# Patient Record
Sex: MALE | Race: AMERICAN INDIAN OR ALASKA NATIVE
[De-identification: names, ages, dates, MRNs, and addresses within clinical notes are randomized per-mention and may not be internally consistent; named-entity substitution may affect disease eponyms.]

---

## 2019-08-05 ENCOUNTER — HOSPITAL ENCOUNTER (EMERGENCY)
Dept: HOSPITAL 74 - JER | Age: 29
Discharge: HOME | End: 2019-08-05
Payer: COMMERCIAL

## 2019-08-05 VITALS — SYSTOLIC BLOOD PRESSURE: 124 MMHG | HEART RATE: 58 BPM | DIASTOLIC BLOOD PRESSURE: 73 MMHG | TEMPERATURE: 97.9 F

## 2019-08-05 VITALS — BODY MASS INDEX: 28.3 KG/M2

## 2019-08-05 DIAGNOSIS — J34.89: Primary | ICD-10-CM

## 2019-08-05 NOTE — PDOC
History of Present Illness





- General


Chief Complaint: Cold Symptoms


Stated Complaint: NOSE CONGESTION


Time Seen by Provider: 08/05/19 04:54


History Source: Patient, Spouse (Wife present at bedside.)


Exam Limitations: No Limitations





- History of Present Illness


Initial Comments: 





HPI: 





28 y/o male presenting to [facility] ER complaining of bilateral nasal congestion 

with sinus pressure. Pt woke from sleep and found he was unable to breath 

through either nare. Was able to breath through his mouth but then his throat 

started to hurt. Trialed Zicam nasal spray without relief. Has not trialed any 

other OTC medications. Denies fevers, chills, or chest pain. No sick contacts. 

Reports this is an acute on chronic problem for the last year. Was evaluated by 

PCP several months ago but failed to f/u. Has never been evaluated by ENT. 





Previously evaluated at this department for chest pain after snorting cocaine. 

States last time he used was over a year ago. 





Social Hx: 


- Non-smoker


- Returned from [facility] in Feb 2019. No additional travel.





Medical Hx: 


- S/p R foot amputation secondary to forklift accident in 2016 





Review of Systems: 


In addition to that documented in the HPI above, the additional ROS was obtained

:


Constitutional: Denies fevers or chills


ENMT: Endorses sore throat after breathing through mouth


CV: Denies chest pain


Resp: Per HPI


GI: Denies vomiting or diarrhea


: Denies dysuria, hematuria, or urinary frequency





Physical Examination: 


Constitutional: Well-developed, well-nourished adult male in no acute distress 

or obvious discomfort. Found sitting upright on edge of hospital bed. Alert and 

oriented x4. Answered all questions appropriately and completely. Speech was non

-labored, non-pressured.    





Head: Normocephalic. No obvious external signs of trauma. Nontender frontal and 

maxillary sinuses. 





Eyes: Sclerae white. 





Ears: Hearing grossly intact.





Nose: No nasal discharge. Hyponasal voice. No deviated septum. Mucosa pink and 

well appearing. 





Throat: Oral cavity and pharynx normal. No inflammation, swelling, exudate, or 

lesions. Teeth and gingiva in good general condition.


 


Neck: Supple, trachea is midline. No cervical or supraclavicular 

lymphadenopathy.  





Cardiovascular / Chest: Regular rate and regular rhythm. No murmur, rubs, clicks

, or gallops. Peripheral pulses: radial pulses full.   


 


Respiratory: Breathing unlabored. Equal chest rise and fall. Clear to 

auscultation bilaterally. No stridor, no wheezing, no rhonchi. 


 


Neuro: Alert and oriented. Moving all four extremities spontaneously. 


  


Skin: Pink, warm, and dry. 


 


Psych: Affect: appropriate.  Mood: normal.








MDM: 


*Reviewed vital signs, nursing notes, and prior visit documentation (if 

available).





Previously healthy 28 y/o male presenting with bilateral nasal congestion and 

sinus pressure for the past few hours. H/o of similar but failed to f/u in 

clinic. Denies recent cocaine abuse. Afebrile. Vitals unremarkable for 

hypotension or tachycardia. Physical exam as described above. Suspect likely 

acute sinusitis. Ordered Pseudoephedrine. 





Pt reassessed and reports improved. Will prescribe outpatient course of 

pseudoephedrine. Entered ENT referral. 





Discussed physical exam findings with pt. Answered all questions. Provided 

return precautions. Pt expressed verbal understanding and agreement with plan 

to discharge home with outpatient follow up.





Bautista Patel M.D., PGY2


Emergency Medicine Resident 








Past History





- Past Medical History


Allergies/Adverse Reactions: 


 Allergies











Allergy/AdvReac Type Severity Reaction Status Date / Time


 


No Known Allergies Allergy   Verified 07/12/18 07:10











Home Medications: 


Ambulatory Orders





Pseudoephedrine HCl [Sudafed] 60 mg PO BID #20 tablet 08/05/19 








COPD: No





- Immunization History


Immunization Up to Date: Yes





- Suicide/Smoking/Psychosocial Hx


Smoking History: Unknown if ever smoked


Have you smoked in the past 12 months: No


Information on smoking cessation initiated: No


Hx Alcohol Use: No


Drug/Substance Use Hx: No





*Physical Exam





- Vital Signs


 Last Vital Signs











Temp Pulse Resp BP Pulse Ox


 


 97.9 F   58 L  17   124/73   98 


 


 08/05/19 04:36  08/05/19 04:36  08/05/19 04:36  08/05/19 04:36  08/05/19 04:36














ED Treatment Course





- Medications


Given in the ED: 


ED Medications














Discontinued Medications














Generic Name Dose Route Start Last Admin





  Trade Name Freq  PRN Reason Stop Dose Admin


 


Pseudoephedrine HCl  60 mg  08/05/19 05:30  08/05/19 05:26





  Sudafed -  PO  08/05/19 05:31  60 mg





  ONCE ONE   Administration





     





     





     





     














*DC/Admit/Observation/Transfer


Diagnosis at time of Disposition: 


 Nasal congestion








- Discharge Dispostion


Disposition: HOME


Condition at time of disposition: Good


Decision to Admit order: No





- Prescriptions


Prescriptions: 


Pseudoephedrine HCl [Sudafed] 60 mg PO BID #20 tablet





- Referrals


Referrals: 


Tatum Watts MD [Primary Care Provider] - 


Albert Colindres MD [Staff Physician] - 





- Patient Instructions


Printed Discharge Instructions:  DI for Sinusitis


Additional Instructions: 


You were seen today for nasal congestion. You likely have acute sinusitis. 





I have sent a prescription for Pseudoephedrine to your pharmacy. Take as 

directed on the package insert. Do not exceed the recommended dosage. 





Follow up with your primary care doctor within the next 3-4 days. You will need 

to call to make an appointment. The number is included in this packet. 


You can also follow up with an ENT doctor. I have placed a referral for you to 

see Dr. Colindres. You will need to call to make an appointment. The number is 

included in this packet. 





Go to the nearest emergency department if your condition worsens or you feel 

like you need additional emergency evaluation. 





Print Language: ENGLISH





- Post Discharge Activity


Forms/Work/School Notes:  Back to Work

## 2019-10-09 ENCOUNTER — HOSPITAL ENCOUNTER (INPATIENT)
Dept: HOSPITAL 74 - JER | Age: 29
LOS: 6 days | Discharge: HOME | DRG: 317 | End: 2019-10-15
Attending: INTERNAL MEDICINE | Admitting: INTERNAL MEDICINE
Payer: COMMERCIAL

## 2019-10-09 VITALS — BODY MASS INDEX: 27.4 KG/M2

## 2019-10-09 DIAGNOSIS — M25.571: ICD-10-CM

## 2019-10-09 DIAGNOSIS — L03.115: ICD-10-CM

## 2019-10-09 DIAGNOSIS — R09.81: ICD-10-CM
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
## 2019-10-09 DIAGNOSIS — T87.43: Primary | ICD-10-CM

## 2019-10-09 DIAGNOSIS — L02.611: ICD-10-CM

## 2019-10-09 DIAGNOSIS — J20.9: ICD-10-CM

## 2019-10-09 LAB
ALBUMIN SERPL-MCNC: 3.8 G/DL (ref 3.4–5)
ALP SERPL-CCNC: 118 U/L (ref 45–117)
ALT SERPL-CCNC: 34 U/L (ref 13–61)
ANION GAP SERPL CALC-SCNC: 6 MMOL/L (ref 8–16)
AST SERPL-CCNC: 25 U/L (ref 15–37)
BASOPHILS # BLD: 0.4 % (ref 0–2)
BILIRUB SERPL-MCNC: 0.5 MG/DL (ref 0.2–1)
BUN SERPL-MCNC: 19.7 MG/DL (ref 7–18)
CALCIUM SERPL-MCNC: 9.4 MG/DL (ref 8.5–10.1)
CHLORIDE SERPL-SCNC: 103 MMOL/L (ref 98–107)
CO2 SERPL-SCNC: 30 MMOL/L (ref 21–32)
CREAT SERPL-MCNC: 1 MG/DL (ref 0.55–1.3)
DEPRECATED RDW RBC AUTO: 13.6 % (ref 11.9–15.9)
EOSINOPHIL # BLD: 0.1 % (ref 0–4.5)
ERYTHROCYTE [SEDIMENTATION RATE] IN BLOOD BY WESTERGREN METHOD: 77 MM/HR (ref 0–10)
GLUCOSE SERPL-MCNC: 92 MG/DL (ref 74–106)
HCT VFR BLD CALC: 43.9 % (ref 35.4–49)
HGB BLD-MCNC: 15 GM/DL (ref 11.7–16.9)
LYMPHOCYTES # BLD: 6.4 % (ref 8–40)
MCH RBC QN AUTO: 29.8 PG (ref 25.7–33.7)
MCHC RBC AUTO-ENTMCNC: 34.3 G/DL (ref 32–35.9)
MCV RBC: 87 FL (ref 80–96)
MONOCYTES # BLD AUTO: 6.8 % (ref 3.8–10.2)
NEUTROPHILS # BLD: 86.3 % (ref 42.8–82.8)
PLATELET # BLD AUTO: 313 K/MM3 (ref 134–434)
PMV BLD: 9.4 FL (ref 7.5–11.1)
POTASSIUM SERPLBLD-SCNC: 4.3 MMOL/L (ref 3.5–5.1)
PROT SERPL-MCNC: 8.4 G/DL (ref 6.4–8.2)
RBC # BLD AUTO: 5.05 M/MM3 (ref 4–5.6)
SODIUM SERPL-SCNC: 139 MMOL/L (ref 136–145)
WBC # BLD AUTO: 11.7 K/MM3 (ref 4–10)

## 2019-10-09 PROCEDURE — G0480 DRUG TEST DEF 1-7 CLASSES: HCPCS

## 2019-10-09 PROCEDURE — A9579 GAD-BASE MR CONTRAST NOS,1ML: HCPCS

## 2019-10-09 RX ADMIN — HEPARIN SODIUM SCH UNIT: 5000 INJECTION, SOLUTION INTRAVENOUS; SUBCUTANEOUS at 22:37

## 2019-10-09 NOTE — CONSULT
Consult


Consult Specialty:: PODIATRY


Referred by:: gavino


Reason for Consultation:: Right foot possible abscess





- History of Present Illness


Chief Complaint: 28 y/o male presents and seen inthe ER with swelling and 

severe pain on the right foot. Also cmplaints of yellow and blood tinged 

sputum. States since roughly 1 weekt he foot has started having severe pain and 

being swolen and red. Is s/p traumatic TMA with donor flap placement which has 

healed nicely. States is needing a CODIE as well to release pressure as the flap 

regularly breaks down but this time has continued pain and sweling for longer 

than usual. Denies any other complaints.





- History Source


History Provided By: Patient


Limitations to Obtaining History: No Limitations





- Alcohol/Substance Use


Hx Alcohol Use: No





- Smoking History


Smoking history: Current every day smoker


Have you smoked in the past 12 months: No





Home Medications





- Allergies


Allergies/Adverse Reactions: 


 Allergies











Allergy/AdvReac Type Severity Reaction Status Date / Time


 


No Known Allergies Allergy   Verified 07/12/18 07:10














- Home Medications


Home Medications: 


Ambulatory Orders





Pseudoephedrine HCl [Sudafed] 60 mg PO BID #20 tablet 08/05/19 











Review of Systems





- Review of Systems


Musculoskeletal: reports: Other (Right:  traumuatic TMA, donor flap noted going 

from plantar distal site up onto the dorsum of ankle, induration noted to the 

plantar flap of the graft with a small wound noted distal plantarly no 

purulence noted, plantar medially along healed incision with severe POP some 

level of fluctuance, mild erythema, mild edema, no streaking, no drainage noted 

at this time.   foot warm to touch and palpable pulses.)


Neurological: reports: No Symptoms





Physical Exam


Vital Signs: 


 Vital Signs











Temperature  97.5 F L  10/09/19 13:02


 


Pulse Rate  64   10/09/19 16:45


 


Respiratory Rate  18   10/09/19 16:45


 


Blood Pressure  120/71   10/09/19 16:45


 


O2 Sat by Pulse Oximetry (%)  98   10/09/19 16:45











Labs: 


 CBC, BMP





 10/09/19 14:15 





 10/09/19 14:15 











Assessment/Plan





A:


Possible abscess right foot 








P:


Evaluated and reviewed


MRI with contrast has been ordered to eval for abscess and will evaluate 

tomorrow


CT to me inconclusive for origin 


Xray negative for any gas


Wbc 11.7 


Iv abx per ID 


Will follow up tomorrow and if + for actual abscess depending on area can do 

bedside needle drainage vs taking to the OR for open I and D on friday AM  


Will follow


thank you for this consult.

## 2019-10-09 NOTE — PDOC
History of Present Illness





- General


Chief Complaint: Edema


Stated Complaint: LT FOOT PAIN


Time Seen by Provider: 10/09/19 13:29


History Source: Patient


Exam Limitations: No Limitations





- History of Present Illness


Initial Comments: 





10/09/19 14:13


28 yo male pmh HLD and R forefoot amputation 2016 after an accident presents to 

the ED with 1 week of F/C, cough, yellow sputum production (with new blood 

tinge this am) along with right foot swelling, redness and pain. Pt states he 

was in [facility] for work, URI symptoms began prior to return to the [place] (returned 

1 week ago), has tried theraflu however symptoms have progressively worsened 

and now admits to bright red blood tinge sputum this am. Pt states he fell on 

the plane leading to right medial ankle pain and states swelling, pain and 

redness/warmth worsening over the past week. 














Past History





- Past Medical History


Allergies/Adverse Reactions: 


 Allergies











Allergy/AdvReac Type Severity Reaction Status Date / Time


 


No Known Allergies Allergy   Verified 07/12/18 07:10











Home Medications: 


Ambulatory Orders





Pseudoephedrine HCl [Sudafed] 60 mg PO BID #20 tablet 08/05/19 








COPD: No





- Immunization History


Immunization Up to Date: Yes





- Psycho Social/Smoking Cessation Hx


Smoking History: Current every day smoker


Have you smoked in the past 12 months: No


Information on smoking cessation initiated: Yes


Hx Alcohol Use: No


Drug/Substance Use Hx: No





**Review of Systems





- Review of Systems


Constitutional: Yes: Chills, Fever


HEENTM: Yes: Other (bloody sputum, cough).  No: Ear Discharge, Nose Congestion


Respiratory: Yes: Productive cough.  No: Shortness of Breath, Wheezing


Cardiac (ROS): Yes: Edema (right stump).  No: Chest Pain


ABD/GI: No: Constipated, Diarrhea, Nausea, Vomiting


: No: Burning, Dysuria, Frequency, Flank Pain


Musculoskeletal: No: Back Pain


Integumentary: No: Change in Color


Neurological: No: Headache, Numbness, Paresthesia, Weakness





*Physical Exam





- Vital Signs


 Last Vital Signs











Temp Pulse Resp BP Pulse Ox


 


 97.5 F L  89   20   153/94   99 


 


 10/09/19 13:02  10/09/19 13:02  10/09/19 13:02  10/09/19 13:02  10/09/19 13:02














- Physical Exam


General Appearance: Yes: Nourished, Appropriately Dressed


HEENT: positive: EOMI, Pharynx Normal, Hearing Grossly Normal, Other (hemoptysis

).  negative: Sinus Tenderness


Neck: positive: Supple.  negative: Carotid bruit


Respiratory/Chest: positive: Lungs Clear, Normal Breath Sounds.  negative: 

Respiratory Distress, Rapid RR, Crackles, Rales, Rhonchi, Stridor, Wheezing


Cardiovascular: positive: Regular Rhythm, Regular Rate, S1, S2.  negative: Edema

, JVD, Murmur


Vascular Pulses: Dorsalis-Pedis (R): 4+, Doralis-Pedis (L): 4+


Gastrointestinal/Abdominal: positive: Flat, Soft.  negative: Pulsatile Mass, 

Distended, Guarding, Rebound, Tenderness


Musculoskeletal: negative: CVA Tenderness


Extremity: positive: Normal Capillary Refill, Normal Inspection, Normal Range 

of Motion


Integumentary: positive: Dry, Warm, Other (erythema to dorsum of foot with 

tenderness and small chronic appearing punctate wound. No area of fluctuance or 

drainage )


Neurologic: positive: Fully Oriented, Alert, Normal Mood/Affect, Normal Response

, Motor Strength 5/5





ED Treatment Course





- LABORATORY


CBC & Chemistry Diagram: 


 10/09/19 14:15





 10/09/19 14:15





- RADIOLOGY


Radiology Studies Ordered: 














 Category Date Time Status


 


 LOWER EXTREMITY CT W/O CONTR [CT] Stat CT Scan  10/09/19 14:02 Ordered


 


 CHEST X-RAY PORTABLE* [RAD] Stat Radiology  10/09/19 13:59 Ordered














Medical Decision Making





- Medical Decision Making





10/09/19 14:28


28 yo male pmh HLD and R forefoot amputation 2016 after an accident presents to 

the ED with 1 week of F/C, cough, yellow sputum production (with new blood 

tinge this am) along with right foot swelling, redness and pain. Pt states he 

was in [facility] for work, URI symptoms began prior to return to the [place] (returned 

1 week ago), has tried theraflu however symptoms have progressively worsened 

and now admits to bright red blood tinge sputum this am. Pt states he fell on 

the plane leading to right medial ankle pain and states swelling, pain and 

redness/warmth worsening over the past week. 

















plan to weight d-dimer results, review CXR and determine if CTA of chest 

required for PE r/o vs PNA





D dimer neg


WBC 11.7














10/09/19 18:06





Case discussed with Dr. Zuleta states pt requires admission for infection of RLE 

with antibiotics 


Consulted Dr. Arnett in Podiatry, states he will come assess pt in the ED and 

would like an MRI with Contrast done to r/o osteo/abscess and will take to OR 

for I&D in the AM if needed





Case presented to Dr. Dai, agrees to have pt accepted to med surg








Discharge





- Discharge Information


Problems reviewed: Yes


Clinical Impression/Diagnosis: 


 Infection (chronic) of amputation stump





Condition: Stable





- Admission


Yes





- Follow up/Referral





- Patient Discharge Instructions





- Post Discharge Activity

## 2019-10-09 NOTE — HP
Admitting History and Physical





- Primary Care Physician


PCP: Keri Dai





- Admission


History of Present Illness: 





28 yo male pmh HLD and R forefoot amputation 2016 after an accident presents to 

the ED with 1 week of F/C, cough, yellow sputum production (with new blood 

tinge this am) along with right foot swelling, redness and pain. Pt states he 

was in [facility] for work, URI symptoms began prior to return to the [place] (returned 

1 week ago), has tried theraflu however symptoms have progressively worsened 

and now admits to bright red blood tinge sputum this am. Pt states he fell on 

the plane leading to right medial ankle pain and states swelling, pain and 

redness/warmth worsening over the past week. 

















- Smoking History


Smoking history: Current every day smoker


Have you smoked in the past 12 months: No





- Alcohol/Substance Use


Hx Alcohol Use: No





Home Medications





- Allergies


Allergies/Adverse Reactions: 


 Allergies











Allergy/AdvReac Type Severity Reaction Status Date / Time


 


No Known Allergies Allergy   Verified 07/12/18 07:10














- Home Medications


Home Medications: 


Ambulatory Orders





NK [No Known Home Medication]  10/09/19 











Physical Examination


Vital Signs: 


 Vital Signs











Temperature  97.5 F L  10/09/19 13:02


 


Pulse Rate  64   10/09/19 16:45


 


Respiratory Rate  18   10/09/19 16:45


 


Blood Pressure  120/71   10/09/19 16:45


 


O2 Sat by Pulse Oximetry (%)  98   10/09/19 16:45











Constitutional: Yes: No Distress


HENT: Yes: Atraumatic


Neck: Yes: Supple


Cardiovascular: Yes: Regular Rate and Rhythm


Respiratory: Yes: CTA Bilaterally


Gastrointestinal: Yes: Normal Bowel Sounds


Extremities: Yes: Other (R foot / stump abcess)


Neurological: Yes: Alert, Oriented


Labs: 


 CBC, BMP





 10/09/19 14:15 





 10/09/19 14:15 











Problem List





- Problems


(1) Infection (chronic) of amputation stump


Assessment/Plan: 


for aspiration of abcess


on iv bax


prn pain meds


Code(s): T87.40 - INFECTION OF AMPUTATION STUMP, UNSPECIFIED EXTREMITY   





(2) Nasal congestion


Code(s): R09.81 - NASAL CONGESTION   





Assessment/Plan





 Laboratory Tests











  10/09/19 10/09/19 10/09/19





  14:15 14:15 14:15


 


WBC   11.7 H 


 


RBC   5.05 


 


Hgb   15.0 


 


Hct   43.9 


 


MCV   87.0 


 


MCH   29.8 


 


MCHC   34.3 


 


RDW   13.6 


 


Plt Count   313  D 


 


MPV   9.4 


 


Absolute Neuts (auto)   10.1 H 


 


Neutrophils %   86.3 H 


 


Lymphocytes %   6.4 L D 


 


Monocytes %   6.8 


 


Eosinophils %   0.1 


 


Basophils %   0.4 


 


Nucleated RBC %   0 


 


ESR   77 H 


 


D-Dimer   


 


Sodium    139


 


Potassium    4.3


 


Chloride    103


 


Carbon Dioxide    30


 


Anion Gap    6 L


 


BUN    19.7 H


 


Creatinine    1.0


 


Est GFR (CKD-EPI)AfAm    115.72


 


Est GFR (CKD-EPI)NonAf    99.85


 


Random Glucose    92


 


Calcium    9.4


 


Total Bilirubin    0.5


 


AST    25


 


ALT    34


 


Alkaline Phosphatase    118 H


 


C-Reactive Protein  12.4 H  


 


Total Protein    8.4 H


 


Albumin    3.8














  10/09/19





  14:15


 


WBC 


 


RBC 


 


Hgb 


 


Hct 


 


MCV 


 


MCH 


 


MCHC 


 


RDW 


 


Plt Count 


 


MPV 


 


Absolute Neuts (auto) 


 


Neutrophils % 


 


Lymphocytes % 


 


Monocytes % 


 


Eosinophils % 


 


Basophils % 


 


Nucleated RBC % 


 


ESR 


 


D-Dimer  322


 


Sodium 


 


Potassium 


 


Chloride 


 


Carbon Dioxide 


 


Anion Gap 


 


BUN 


 


Creatinine 


 


Est GFR (CKD-EPI)AfAm 


 


Est GFR (CKD-EPI)NonAf 


 


Random Glucose 


 


Calcium 


 


Total Bilirubin 


 


AST 


 


ALT 


 


Alkaline Phosphatase 


 


C-Reactive Protein 


 


Total Protein 


 


Albumin 








Active Medications











Generic Name Dose Route Start Last Admin





  Trade Name Freq  PRN Reason Stop Dose Admin


 


Cefazolin Sodium  1 gm in 50 mls @ 100 mls/hr  10/10/19 10:00  





  Ancef 1 Gm Premixed Ivpb -  IVPB   





  DAILY DALILA   





     





     





     





     


 


Vancomycin HCl 1,500 mg/  500 mls @ 250 mls/2 hr  10/09/19 18:42  10/09/19 19:04





  Dextrose  IVPB  10/09/19 22:40  250 mls/2 hr





  ONCE ONE   Administration





     





     





  Protocol   





     








Active Medications











Generic Name Dose Route Start Last Admin





  Trade Name Freq  PRN Reason Stop Dose Admin


 


Acetaminophen  650 mg  10/10/19 21:29  





  Tylenol -  PO   





  Q6H PRN   





  FEVER   





     





     





     


 


Fentanyl  50 mcg  10/10/19 21:29  





  Sublimaze Injection -  IVPUSH   





  [id number] PRN   





  PAIN-PACU ORDER X 4 DOSES ONLY   





     





     





     


 


Heparin Sodium (Porcine)  5,000 unit  10/10/19 22:00  10/11/19 10:03





  Heparin -  SQ   5,000 unit





  BID DALILA   Administration





     





     





     





     


 


Hydromorphone HCl  2 mg  10/10/19 21:29  10/11/19 15:19





  Dilaudid Vial -  IVPB   2 mg





  Q4H PRN   Administration





  PAIN 4-8   





     





     





     


 


Lactated Ringer's  1,000 mls @ 75 mls/hr  10/10/19 21:29  10/10/19 21:40





  Lactated Ringers Solution  IV   0 mls





  ASDIR DALILA   Administration





     





     





     





     


 


Cefazolin Sodium  1 gm in 50 mls @ 100 mls/hr  10/11/19 18:00  10/11/19 17:18





  Ancef 1 Gm Premixed Ivpb -  IVPB   100 mls/hr





  Q8H-IV DALILA   Administration





     





     





     





     


 


Ondansetron HCl  4 mg  10/10/19 21:29  





  Zofran Injection  IVPUSH   





  Q6H PRN   





  NAUSEA AND/OR VOMITING   





     





     





     


 


Oxycodone HCl  5 mg  10/10/19 21:29  10/11/19 10:12





  Roxicodone -  PO   5 mg





  Q4H PRN   Administration





  PAIN LEVEL 1-3

## 2019-10-09 NOTE — PDOC
Documentation entered by Hong Zayas SCRIBE, acting as scribe for Kiersten An MD.








Kiersten An MD:  This documentation has been prepared by the Chana singh Xhesika, SCRIBE, under my direction and personally reviewed by me in its 

entirety.  I confirm that the documentation accurately reflects all work, 

treatment, procedures, and medical decision making performed by me.  





Attending Attestation





- Resident


Resident Name: Mike Pacheco





- ED Attending Attestation


I have performed the following: I have examined & evaluated the patient, The 

case was reviewed & discussed with the resident, I agree w/resident's findings 

& plan





- HPI


HPI: 





10/09/19 14:23


The patient is a 29 year old male with a significant past medical history of 

HLD and  R foot amputation (secondary to forklift accident in 2016)  who 

presents to the ED with 1 week of subjective fever, chills, productive cough  

and R foot pain and swelling. cough notable for progressive blood tinged and 

yellow mucus.


Patient notes he had a trip to [facility] for work, came back a week ago, however, 

his symptoms began prior to his return to the [place]. Pt notes he tried theraflu 

with no improvement of symptoms. 


he tripped with his prosthesis in place while at the airport ~ 1 week ago, 

since then right stump pain and swelling.





Allergies: None


Past Medical History: HLD and  R foot amputation 


Social history: Lives with family. Current everyday smoker


Surgical history: S/p R foot amputation secondary to forklift accident in 2016 


Meds: none





10/09/19 14:35








- Physicial Exam


PE: 





10/09/19 14:24





Agree with the resident's HPI and PE as documented in the electronic medical 

record.


NAD, well appearing, EOMI, PERRL, nl conjunctiva, anicteric; neck supple. lungs 

diminished breath sounds, scant wheeze on expiration. RRR, abdomen soft 

nontender. Back nontender. FARRELL x4, no focal neuro deficits. No peripheral 

edema. normal color for ethnicity, WWP. no calf tenderness. no LE edema.


soft compartment of right stump. Right distal foot amputation, tender to 

palpation over the stump with palp swelling, old surgical scars present. No 

drainage or wounds appreciated.





10/09/19 14:33





10/09/19 14:58





10/09/19 18:31








- Medical Decision Making





10/09/19 14:37


Vital Signs











Temp Pulse Resp BP Pulse Ox


 


 97.5 F L  89   20   153/94   99 


 


 10/09/19 13:02  10/09/19 13:02  10/09/19 13:02  10/09/19 13:02  10/09/19 13:02








Differential diagnosis includes viral syndrome, infection, bronchiectasis, 

bronchitis, pneumonia, pleurisy.  Soft tissue infection, stump wound, contusion

, hematoma.





Vital signs are within normal limits, afebrile, hemodynamically appropriate.  

Basic labs and lites are also within normal limits, cultures are pending.  X-

ray imaging, dimer to evaluate for possible PE given recent travel and now 

hemoptysis but symptoms are more consistent with a infection given he has cough 

and subjective fevers.  Chest x-ray will also check for active pulmonary TB. 


neg for mass, infection/infiltrate or effusion, normal cardiac silhouette


xray s/p amputation at midfoot, no collection, no bony erosions


labs and lytes - neg dimer, unlikely PE





given analgesia, duoneb, reassess, abx





CT foot with ?fluid collection vs abscess vs phlegmon. 


IV ancef and vancomycin for empiric coverage, including MRSA


admit for pain control, foot infection, possible I&D, medical management - call 

to podiatrist  Dr Can, will come to eval and decide on plan/intervention 


admit to Dr Dai service.





10/09/19 18:29








**Heart Score/ECG Review


  ** #1


ECG reviewed & interpreted by me at: 15:40


General ECG Interpretation: Sinus Rhythm, Normal Rate, Normal Intervals


Compared to previous ECG there are: Previous ECG unavail





10/09/19 16:17


nonspecific 


T wave abnormalities in III only, no ST elevations or depressions

## 2019-10-10 LAB
ALBUMIN SERPL-MCNC: 3.7 G/DL (ref 3.4–5)
ALP SERPL-CCNC: 105 U/L (ref 45–117)
ALT SERPL-CCNC: 35 U/L (ref 13–61)
ANION GAP SERPL CALC-SCNC: 8 MMOL/L (ref 8–16)
AST SERPL-CCNC: 20 U/L (ref 15–37)
BASOPHILS # BLD: 0.4 % (ref 0–2)
BILIRUB SERPL-MCNC: 0.3 MG/DL (ref 0.2–1)
BUN SERPL-MCNC: 16.3 MG/DL (ref 7–18)
CALCIUM SERPL-MCNC: 9.1 MG/DL (ref 8.5–10.1)
CHLORIDE SERPL-SCNC: 104 MMOL/L (ref 98–107)
CO2 SERPL-SCNC: 24 MMOL/L (ref 21–32)
CREAT SERPL-MCNC: 0.9 MG/DL (ref 0.55–1.3)
DEPRECATED RDW RBC AUTO: 13.7 % (ref 11.9–15.9)
EOSINOPHIL # BLD: 0.2 % (ref 0–4.5)
GLUCOSE SERPL-MCNC: 111 MG/DL (ref 74–106)
HCT VFR BLD CALC: 42.2 % (ref 35.4–49)
HGB BLD-MCNC: 14.6 GM/DL (ref 11.7–16.9)
LYMPHOCYTES # BLD: 9.2 % (ref 8–40)
MCH RBC QN AUTO: 30.3 PG (ref 25.7–33.7)
MCHC RBC AUTO-ENTMCNC: 34.6 G/DL (ref 32–35.9)
MCV RBC: 87.6 FL (ref 80–96)
MONOCYTES # BLD AUTO: 5 % (ref 3.8–10.2)
NEUTROPHILS # BLD: 85.2 % (ref 42.8–82.8)
PLATELET # BLD AUTO: 311 K/MM3 (ref 134–434)
PMV BLD: 9.3 FL (ref 7.5–11.1)
POTASSIUM SERPLBLD-SCNC: 4.2 MMOL/L (ref 3.5–5.1)
PROT SERPL-MCNC: 7.9 G/DL (ref 6.4–8.2)
RBC # BLD AUTO: 4.81 M/MM3 (ref 4–5.6)
SODIUM SERPL-SCNC: 136 MMOL/L (ref 136–145)
WBC # BLD AUTO: 8.9 K/MM3 (ref 4–10)

## 2019-10-10 PROCEDURE — 0J9Q0ZZ DRAINAGE OF RIGHT FOOT SUBCUTANEOUS TISSUE AND FASCIA, OPEN APPROACH: ICD-10-PCS | Performed by: PODIATRIST

## 2019-10-10 PROCEDURE — 3E10X8Z IRRIGATION OF SKIN AND MUCOUS MEMBRANES USING IRRIGATING SUBSTANCE: ICD-10-PCS | Performed by: PODIATRIST

## 2019-10-10 RX ADMIN — HEPARIN SODIUM SCH UNIT: 5000 INJECTION, SOLUTION INTRAVENOUS; SUBCUTANEOUS at 22:19

## 2019-10-10 RX ADMIN — HEPARIN SODIUM SCH UNIT: 5000 INJECTION, SOLUTION INTRAVENOUS; SUBCUTANEOUS at 10:50

## 2019-10-10 NOTE — EKG
Test Reason : 

Blood Pressure : ***/*** mmHG

Vent. Rate : 074 BPM     Atrial Rate : 074 BPM

   P-R Int : 118 ms          QRS Dur : 082 ms

    QT Int : 376 ms       P-R-T Axes : 046 049 023 degrees

   QTc Int : 417 ms

 

*** POOR DATA QUALITY, INTERPRETATION MAY BE ADVERSELY AFFECTED

NORMAL SINUS RHYTHM

NORMAL ECG

WHEN COMPARED WITH ECG OF 12-JUL-2018 06:45,

NO SIGNIFICANT CHANGE WAS FOUND

Confirmed by TAQUERIA WALSH, IMANI ([id number]) on 10/10/2019 12:20:05 PM

 

Referred By:             Confirmed By:IMANI MENG MD

## 2019-10-10 NOTE — PN
Progress Note (short form)





- Note


Progress Note: 


PODIATRY





30 y/o male s/p right foot traumatic TMA after accident seen with pain and 

swelling of the right stump site. Pain mostly just proximal to the flap 

placement. had MRI done last night. States pain is decreased and redness down 

but still uncomfortable. Denies any f/c/n/v/sob. 











O:


Right foot with free graft site with decreased swelling, compelte neuropathy of 

the free graft, just plantar proximal at the incision line mild erythema and 

pain on palpation in that area, no open wound, no purulence, no streaking, no 

ascending cellulitis; erythema mildly improved today 








A:


Cellulitis of TMA stump//Free graft 








P:


Evaluated and reveiwed


MRI read not yet done  yet; personal review at this time shows alot of 

inflammation and edema in the free graft 


Will follow today and await read of the MRI; if true abscess is seen will plan 

for the OR tomorrow to open and flush


Cont Iv abx per ID. 


Will follow.

## 2019-10-10 NOTE — PN
Progress Note, Physician


History of Present Illness: 





patient c/o of severe pain


podiatry note noted





- Current Medication List


Current Medications: 


Active Medications





Acetaminophen (Tylenol -)  650 mg PO Q6H PRN


   PRN Reason: FEVER


Heparin Sodium (Porcine) (Heparin -)  5,000 unit SQ BID [facility]


   Last Admin: 10/10/19 10:50 Dose:  5,000 unit


Hydromorphone HCl (Dilaudid Vial -)  1 mg IVPB Q4H PRN


   PRN Reason: PAIN LEVEL 4-6


   Last Admin: 10/10/19 10:48 Dose:  1 mg


Cefazolin Sodium (Ancef 1 Gm Premixed Ivpb -)  1 gm in 50 mls @ 100 mls/hr IVPB 

DAILY [facility]


   Last Admin: 10/10/19 10:50 Dose:  100 mls/hr











- Objective


Vital Signs: 


 Vital Signs











Temperature  98.0 F   10/10/19 05:50


 


Pulse Rate  81   10/10/19 10:00


 


Respiratory Rate  18   10/10/19 10:00


 


Blood Pressure  143/83   10/10/19 10:00


 


O2 Sat by Pulse Oximetry (%)  98   10/09/19 23:39











Constitutional: Yes: No Distress, Calm


Cardiovascular: Yes: Regular Rate and Rhythm


Respiratory: Yes: Regular, CTA Bilaterally


Gastrointestinal: Yes: Normal Bowel Sounds, Soft


Musculoskeletal: Yes: WNL


Extremities: Yes: Erythema, Other


Neurological: Yes: Alert, Oriented


Psychiatric: Yes: Alert, Oriented


Labs: 


 CBC, BMP





 10/10/19 08:27 





 10/10/19 08:27 











Assessment/Plan





continue abx


await for aspiration of the collection


rest as per the team

## 2019-10-10 NOTE — CON.ID
Consult


Consult Specialty:: infectious diseases


Referred by:: 


Reason for Consultation:: cellulitis of the amputated foot,pain





- History of Present Illness


Chief Complaint: swelling of the foot ,pain and tenderness


History of Present Illness: 





30 y/o male presents and seen inthe ER with swelling and severe pain on the 

right foot. patient also mentions that he has some sputum which is blood tinged


pain and swelling ahs been going on for some time and the leg became red and 

the patient decided to come to the hospital


 Is s/p traumatic TMA with donor flap placement which has healed nicely. States 

is needing a CODIE as well to release pressure as the flap regularly breaks down 

but this time has continued pain and swelling for longer than usual. Denies any 

other complaints.


patient also has a small opening on the foot which according to him does not 

drain anything








- History Source


History Provided By: Patient


Limitations to Obtaining History: No Limitations





- Alcohol/Substance Use


Hx Alcohol Use: No





- Smoking History


Smoking history: Never smoked


Have you smoked in the past 12 months: No





Home Medications





- Allergies


Allergies/Adverse Reactions: 


 Allergies











Allergy/AdvReac Type Severity Reaction Status Date / Time


 


No Known Allergies Allergy   Verified 07/12/18 07:10














- Home Medications


Home Medications: 


Ambulatory Orders





NK [No Known Home Medication]  10/09/19 











Review of Systems





- Review of Systems


Constitutional: reports: No Symptoms


Eyes: reports: No Symptoms


HENT: reports: No Symptoms


Neck: reports: No Symptoms


Cardiovascular: reports: No Symptoms


Respiratory: reports: No Symptoms


Gastrointestinal: reports: No Symptoms


Genitourinary: reports: No Symptoms


Musculoskeletal: reports: Muscle Pain


Integumentary: reports: Erythema, Other


Neurological: reports: No Symptoms


Endocrine: reports: No Symptoms


Hematology/Lymphatic: reports: No Symptoms


Psychiatric: reports: No Symptoms





Physical Exam


Vital Signs: 


 Vital Signs











Temperature  98.0 F   10/10/19 05:50


 


Pulse Rate  81   10/10/19 10:00


 


Respiratory Rate  18   10/10/19 10:00


 


Blood Pressure  143/83   10/10/19 10:00


 


O2 Sat by Pulse Oximetry (%)  98   10/09/19 23:39











Constitutional: Yes: Well Nourished, Calm, Mild Distress


Eyes: Yes: Conjunctiva Clear


HENT: Yes: Atraumatic, Normocephalic


Neck: Yes: Supple, Trachea Midline


Cardiovascular: Yes: Regular Rate and Rhythm


Respiratory: Yes: Regular, CTA Bilaterally


Gastrointestinal: Yes: Normal Bowel Sounds, Soft


Musculoskeletal: Yes: WNL


Extremities: Yes: Erythema (at the amputated site), Other


Wound/Incision: Yes: Other (a small spot on the plantar aspect worry is if the 

infection is collecting)


Neurological: Yes: Alert, Oriented


Psychiatric: Yes: Alert, Oriented


Labs: 


 CBC, BMP





 10/10/19 08:27 





 10/10/19 08:27 











Assessment/Plan





after examining the foot i am worried that there might be a collection and the 

spot is from where he might have drained


also cellulitis noted


will start him on abx


and also i think patient should get and mri of the foot to see if there is any 

collection


once we have the final results we will decide further plan

## 2019-10-10 NOTE — OP
Operative Note





- Note:


Operative Date: 10/10/19


Pre-Operative Diagnosis: right foot abscess


Operation: right foot incision and drainge.


Findings: 





see dictation


Post-Operative Diagnosis: Same as Pre-op


Surgeon: Santi Arnett


Anesthesia: Local, MAC


Specimens Removed: wound culture


Estimated Blood Loss (mls): 10


Operative Report Dictated: Yes

## 2019-10-10 NOTE — CONSULT
Consult


Consult Specialty:: Podiatry


Reason for Consultation:: Emergent cellulitis abscess right foot-2nd opinion.





- History of Present Illness


Chief Complaint: Cellulitis abscess right foot.


History of Present Illness: 


Has had a taumatic amputation in 2016.  Fork lift crushed his foot.  Fell 2 

weeks ago.  Presented to ER with red hot swollen foot with abscess.   








- History Source


History Provided By: Patient





- Alcohol/Substance Use


Hx Alcohol Use: No





- Smoking History


Smoking history: Never smoked


Have you smoked in the past 12 months: No





Home Medications





- Allergies


Allergies/Adverse Reactions: 


 Allergies











Allergy/AdvReac Type Severity Reaction Status Date / Time


 


No Known Allergies Allergy   Verified 07/12/18 07:10














- Home Medications


Home Medications: 


Ambulatory Orders





NK [No Known Home Medication]  10/09/19 











Physical Exam


Vital Signs: 


 Vital Signs











Temperature  98.0 F   10/10/19 14:11


 


Pulse Rate  58 L  10/10/19 14:11


 


Respiratory Rate  18   10/10/19 10:00


 


Blood Pressure  118/73   10/10/19 14:11


 


O2 Sat by Pulse Oximetry (%)  98   10/09/19 23:39











Musculoskeletal: Yes: Other (+cellulitis ascending right foot, +abscess right 

foot, +severe tenderness)


Labs: 


 CBC, BMP





 10/10/19 08:27 





 10/10/19 08:27 











Assessment/Plan


abscess


cellulitis





Called his Podiatrist and discussed cased and we agreed that patient needs I&D 

today rather than tomorrow.  Patient had last meal around 12pm.  Will sign off 

case at this time.  Please re-consult if necessary.  NPO.

## 2019-10-10 NOTE — OP
DATE OF OPERATION:  10/10/2019

 

PREOPERATIVE DIAGNOSIS:  Right foot abscess.  

 

POSTOPERATIVE DIAGNOSIS:  Right foot abscess.  

 

PROCEDURE PERFORMED:  Right foot incision and drainage.

 

SURGEON:  Santi Arnett DPM 

 

ANESTHESIA:  Local with IV sedation.  

 

INDICATION:  The patient is a 29-year-old male with the above mentioned diagnosis. 

The patient requires immediate surgical intervention for the conditions listed above.

 After careful explanation of the risks, benefits, and complications for the proposed

procedure including graft failure, the patient signed the consent form.  All

questions and concerns were addressed at this time prior to taking patient to the OR.

 P.o. status was verified.  Preoperative antibiotics were given.  

 

OPERATIVE PROCEDURE:  Patient brought to operating room table and placed on operating

table in the supine position.  A pneumatic ankle tourniquet was utilized on the _____

induction of IV sedation, local injection of 10 mL of 2% lidocaine plain was injected

into the patient's _____ nerve without complication.  Following local anesthetic, the

left foot was prepped and draped in normal sterile manner and the procedure began. 

Procedure number 1:  Right foot incision and drainage at this time with the distal

plantar aspect of the patient's right foot.  It should be noted he has already had a

traumatic transmetatarsal amputation with a free flap placement across the top of it

at the most plantar aspect of this free flap, there was noted to be a large area,

increasingly large abscess with some streaking erythema going along the mid foot

plantarly at this time.  An incision was made directly overlying the old incision,

placement along where the flap met the plantar skin on the bottom of the foot. 

Immediately upon incising the skin on the plantar aspect, roughly 15 mL of

seropurulent material drained out.  The incision was then carried medially and

laterally to the medial aspect of the foot and across the roughly central plantar

aspect of the foot.  The incision was then opened and roughly another 5 mL of

purulent material was then expressed from the area; using manual decompression,  all

remaining pockets of seropurulence were then decompressed, and then at this time

attention was directed to the plantar medial aspect of the foot, where there is noted

to be some erythema.  Utilizing Metzenbaum scissors and [place] elevator, the plantar

aspect of the foot was explored, but there was no further purulent drainage to be

noted at this time.  It should be noted that unfortunately the flap viability on the

plantar aspect is going to be questionable due to the breakdown in the underlying

tissues from the infection which seems to have been present for quite a number of

days and is just recently worsened.  Following manual decompression, the wound was

then copiously flushed with pulse lavage with bacitracin mixed 3 L of fluid was

flushed to the bottom of the foot.  Prior this, it should be noted wound cultures

were obtained and sent for microbiology.  Deep wound was then packed with iodoform

packing and partially closed with 3-0 nylon, leaving open to tolerated anesthesia and

procedure well.  Patient was given 10 mL of 0.5% Marcaine plain block following the

procedure, transferred to recovery room with vital signs stable and neurovascular

status intact to right foot.  The patient will need to be followed closely over the

next couple of days for any further abscess formation versus graft failure. 

 

 

ULICES [id number]

DD: 10/10/2019 21:04

DT: 10/10/2019 22:24

Job #:  [id number]

## 2019-10-10 NOTE — PN
Progress Note, Physician





- Current Medication List


Current Medications: 


Active Medications





Acetaminophen (Tylenol -)  650 mg PO Q6H PRN


   PRN Reason: FEVER


Heparin Sodium (Porcine) (Heparin -)  5,000 unit SQ BID [facility]


   Last Admin: 10/10/19 10:50 Dose:  5,000 unit


Hydromorphone HCl (Dilaudid Vial -)  2 mg IVPB Q4H PRN


   PRN Reason: PAIN 4-8


   Last Admin: 10/10/19 14:24 Dose:  2 mg


Cefazolin Sodium (Ancef 1 Gm Premixed Ivpb -)  1 gm in 50 mls @ 100 mls/hr IVPB 

DAILY [facility]


   Last Admin: 10/10/19 10:50 Dose:  100 mls/hr











- Objective


Vital Signs: 


 Vital Signs











Temperature  98.0 F   10/10/19 14:11


 


Pulse Rate  58 L  10/10/19 14:11


 


Respiratory Rate  18   10/10/19 10:00


 


Blood Pressure  118/73   10/10/19 14:11


 


O2 Sat by Pulse Oximetry (%)  98   10/09/19 23:39











Constitutional: Yes: No Distress


HENT: Yes: Atraumatic


Neck: Yes: Supple


Cardiovascular: Yes: Regular Rate and Rhythm


Respiratory: Yes: CTA Bilaterally


Gastrointestinal: Yes: Normal Bowel Sounds


Extremities: Yes: Other (R foot abcess)


Neurological: Yes: Alert, Oriented


Labs: 


 CBC, BMP





 10/10/19 08:27 





 10/10/19 08:27 











Problem List





- Problems


(1) Infection (chronic) of amputation stump


Assessment/Plan: 


for aspiration of abcess


d/w podiatry


Code(s): T87.40 - INFECTION OF AMPUTATION STUMP, UNSPECIFIED EXTREMITY   





(2) Nasal congestion


Code(s): R09.81 - NASAL CONGESTION

## 2019-10-11 RX ADMIN — HEPARIN SODIUM SCH UNIT: 5000 INJECTION, SOLUTION INTRAVENOUS; SUBCUTANEOUS at 10:03

## 2019-10-11 RX ADMIN — CEFAZOLIN SODIUM SCH MLS/HR: 1 INJECTION, SOLUTION INTRAVENOUS at 17:18

## 2019-10-11 RX ADMIN — HEPARIN SODIUM SCH UNIT: 5000 INJECTION, SOLUTION INTRAVENOUS; SUBCUTANEOUS at 22:07

## 2019-10-11 NOTE — PN
Progress Note (short form)





- Note


Progress Note: 





29 y /o male POD 1 right foot I and D at past amputation site. States feeling 

much better today. Pain level far decreased. Denies any other complaints. Is 

wondering about flap survivability. 








O:


Incision plantar left foot with opening and packing in place centrally, packing 

pulled, no further purulence noted, mild erythema stillin plantar arch but no 

progression and much less POP noted, no streaking no malodor








A:


28 y/o POD 1 right foot I and D








P:


Evaluated and reviewed 


will need to closely track progression and if any worsening again of redness 

may need secondary flush


but today looking much improved 


will need to track flap viability; discussed with him and he will need f/u with 

wound care as well as should have f/u with plastic surgeon


patient clinically improved


follow cultures


IV abx per ID 


will follow.

## 2019-10-11 NOTE — PN
Progress Note, Physician


History of Present Illness: 





stable


no new issues


patient got aspirated last night


feels better





- Current Medication List


Current Medications: 


Active Medications





Acetaminophen (Tylenol -)  650 mg PO Q6H PRN


   PRN Reason: FEVER


Fentanyl (Sublimaze Injection -)  50 mcg IVPUSH [id number] PRN


   PRN Reason: PAIN-PACU ORDER X 4 DOSES ONLY


Heparin Sodium (Porcine) (Heparin -)  5,000 unit SQ BID [facility]


   Last Admin: 10/11/19 10:03 Dose:  5,000 unit


Hydromorphone HCl (Dilaudid Vial -)  2 mg IVPB Q4H PRN


   PRN Reason: PAIN 4-8


   Last Admin: 10/11/19 03:07 Dose:  2 mg


Lactated Ringer's (Lactated Ringers Solution)  1,000 mls @ 75 mls/hr IV ASDIR 

[facility]


   Last Admin: 10/10/19 21:40 Dose:  0 mls


Ondansetron HCl (Zofran Injection)  4 mg IVPUSH Q6H PRN


   PRN Reason: NAUSEA AND/OR VOMITING


Oxycodone HCl (Roxicodone -)  5 mg PO Q4H PRN


   PRN Reason: PAIN LEVEL 1-3


   Last Admin: 10/11/19 10:12 Dose:  5 mg











- Objective


Vital Signs: 


 Vital Signs











Temperature  98.4 F   10/11/19 06:00


 


Pulse Rate  58 L  10/11/19 06:00


 


Respiratory Rate  20   10/11/19 06:00


 


Blood Pressure  116/58 L  10/11/19 06:00


 


O2 Sat by Pulse Oximetry (%)  98   10/10/19 21:50











Constitutional: Yes: No Distress, Calm


Cardiovascular: Yes: Regular Rate and Rhythm


Respiratory: Yes: Regular, CTA Bilaterally


Gastrointestinal: Yes: Normal Bowel Sounds, Soft


Musculoskeletal: Yes: WNL


Extremities: Yes: Other


Wound/Incision: Yes: Dressing Dry and Intact


Neurological: Yes: Alert, Oriented


Psychiatric: Yes: Alert, Oriented


Labs: 


 CBC, BMP





 10/10/19 08:27 





 10/10/19 08:27 











Assessment/Plan





patient doing post op well


no issues


await for cx report


continue iv abx


wound care


rest as per the team

## 2019-10-11 NOTE — PN
Progress Note, Physician





- Current Medication List


Current Medications: 


Active Medications





Acetaminophen (Tylenol -)  650 mg PO Q6H PRN


   PRN Reason: FEVER


Fentanyl (Sublimaze Injection -)  50 mcg IVPUSH [id number] PRN


   PRN Reason: PAIN-PACU ORDER X 4 DOSES ONLY


Heparin Sodium (Porcine) (Heparin -)  5,000 unit SQ BID [facility]


   Last Admin: 10/11/19 10:03 Dose:  5,000 unit


Hydromorphone HCl (Dilaudid Vial -)  2 mg IVPB Q4H PRN


   PRN Reason: PAIN 4-8


   Last Admin: 10/11/19 15:19 Dose:  2 mg


Lactated Ringer's (Lactated Ringers Solution)  1,000 mls @ 75 mls/hr IV ASDIR 

[facility]


   Last Admin: 10/10/19 21:40 Dose:  0 mls


Cefazolin Sodium (Ancef 1 Gm Premixed Ivpb -)  1 gm in 50 mls @ 100 mls/hr IVPB 

Q8H-IV [facility]


   Last Admin: 10/11/19 17:18 Dose:  100 mls/hr


Ondansetron HCl (Zofran Injection)  4 mg IVPUSH Q6H PRN


   PRN Reason: NAUSEA AND/OR VOMITING


Oxycodone HCl (Roxicodone -)  5 mg PO Q4H PRN


   PRN Reason: PAIN LEVEL 1-3


   Last Admin: 10/11/19 10:12 Dose:  5 mg











- Objective


Vital Signs: 


 Vital Signs











Temperature  98.2 F   10/11/19 10:00


 


Pulse Rate  98 H  10/11/19 10:00


 


Respiratory Rate  20   10/11/19 10:00


 


Blood Pressure  120/86   10/11/19 10:00


 


O2 Sat by Pulse Oximetry (%)  98   10/11/19 09:00











Constitutional: Yes: No Distress


HENT: Yes: Atraumatic


Neck: Yes: Supple


Cardiovascular: Yes: Regular Rate and Rhythm


Respiratory: Yes: CTA Bilaterally


Gastrointestinal: Yes: Normal Bowel Sounds


Extremities: Yes: Other (R foot in dressing)


Neurological: Yes: Alert, Oriented


Labs: 


 CBC, BMP





 10/10/19 08:27 





 10/10/19 08:27 











Problem List





- Problems


(1) Infection (chronic) of amputation stump


Assessment/Plan: 


s/p aspiration of abcess


on iv bax


prn pain meds


Code(s): T87.40 - INFECTION OF AMPUTATION STUMP, UNSPECIFIED EXTREMITY   





(2) Nasal congestion


Code(s): R09.81 - NASAL CONGESTION

## 2019-10-11 NOTE — PN
Progress Note (short form)





- Note


Progress Note: 





POD1 s/p right foot debridement of infected amputation stump, under GA.  Doing 

well today; pain is well controlled, VSS, no complaints noted.  No anesthetic 

issues/complications noted.

## 2019-10-12 LAB
ALBUMIN SERPL-MCNC: 3.3 G/DL (ref 3.4–5)
ALP SERPL-CCNC: 86 U/L (ref 45–117)
ALT SERPL-CCNC: 29 U/L (ref 13–61)
ANION GAP SERPL CALC-SCNC: 6 MMOL/L (ref 8–16)
AST SERPL-CCNC: 25 U/L (ref 15–37)
BASOPHILS # BLD: 0.8 % (ref 0–2)
BILIRUB SERPL-MCNC: 0.2 MG/DL (ref 0.2–1)
BUN SERPL-MCNC: 8.1 MG/DL (ref 7–18)
CALCIUM SERPL-MCNC: 9 MG/DL (ref 8.5–10.1)
CHLORIDE SERPL-SCNC: 102 MMOL/L (ref 98–107)
CO2 SERPL-SCNC: 31 MMOL/L (ref 21–32)
CREAT SERPL-MCNC: 0.9 MG/DL (ref 0.55–1.3)
DEPRECATED RDW RBC AUTO: 13.3 % (ref 11.9–15.9)
EOSINOPHIL # BLD: 4.2 % (ref 0–4.5)
GLUCOSE SERPL-MCNC: 75 MG/DL (ref 74–106)
HCT VFR BLD CALC: 41.5 % (ref 35.4–49)
HGB BLD-MCNC: 13.8 GM/DL (ref 11.7–16.9)
LYMPHOCYTES # BLD: 39.1 % (ref 8–40)
MCH RBC QN AUTO: 29.3 PG (ref 25.7–33.7)
MCHC RBC AUTO-ENTMCNC: 33.3 G/DL (ref 32–35.9)
MCV RBC: 88 FL (ref 80–96)
MONOCYTES # BLD AUTO: 9.9 % (ref 3.8–10.2)
NEUTROPHILS # BLD: 46 % (ref 42.8–82.8)
PLATELET # BLD AUTO: 296 K/MM3 (ref 134–434)
PMV BLD: 9.4 FL (ref 7.5–11.1)
POTASSIUM SERPLBLD-SCNC: 4.7 MMOL/L (ref 3.5–5.1)
PROT SERPL-MCNC: 6.9 G/DL (ref 6.4–8.2)
RBC # BLD AUTO: 4.71 M/MM3 (ref 4–5.6)
SODIUM SERPL-SCNC: 138 MMOL/L (ref 136–145)
WBC # BLD AUTO: 8.3 K/MM3 (ref 4–10)

## 2019-10-12 RX ADMIN — CEFAZOLIN SODIUM SCH: 1 INJECTION, SOLUTION INTRAVENOUS at 17:22

## 2019-10-12 RX ADMIN — HEPARIN SODIUM SCH UNIT: 5000 INJECTION, SOLUTION INTRAVENOUS; SUBCUTANEOUS at 10:11

## 2019-10-12 RX ADMIN — VANCOMYCIN HYDROCHLORIDE SCH MLS/HR: 1 INJECTION, POWDER, LYOPHILIZED, FOR SOLUTION INTRAVENOUS at 20:33

## 2019-10-12 RX ADMIN — CEFAZOLIN SODIUM SCH MLS/HR: 1 INJECTION, SOLUTION INTRAVENOUS at 10:09

## 2019-10-12 RX ADMIN — HEPARIN SODIUM SCH UNIT: 5000 INJECTION, SOLUTION INTRAVENOUS; SUBCUTANEOUS at 22:04

## 2019-10-12 RX ADMIN — CEFAZOLIN SODIUM SCH MLS/HR: 1 INJECTION, SOLUTION INTRAVENOUS at 16:58

## 2019-10-12 RX ADMIN — CEFAZOLIN SODIUM SCH MLS/HR: 1 INJECTION, SOLUTION INTRAVENOUS at 01:27

## 2019-10-12 NOTE — PN
Progress Note, Physician





- Current Medication List


Current Medications: 


Active Medications





Acetaminophen (Tylenol -)  650 mg PO Q6H PRN


   PRN Reason: FEVER


Fentanyl (Sublimaze Injection -)  50 mcg IVPUSH [id number] PRN


   PRN Reason: PAIN-PACU ORDER X 4 DOSES ONLY


Heparin Sodium (Porcine) (Heparin -)  5,000 unit SQ BID [facility]


   Last Admin: 10/12/19 10:11 Dose:  5,000 unit


Hydromorphone HCl (Dilaudid Vial -)  2 mg IVPB Q4H PRN


   PRN Reason: PAIN 4-8


   Last Admin: 10/12/19 12:33 Dose:  2 mg


Cefazolin Sodium (Ancef 1 Gm Premixed Ivpb -)  1 gm in 50 mls @ 100 mls/hr IVPB 

Q8H-IV [facility]


   Last Admin: 10/12/19 10:09 Dose:  100 mls/hr


Ondansetron HCl (Zofran Injection)  4 mg IVPUSH Q6H PRN


   PRN Reason: NAUSEA AND/OR VOMITING


Oxycodone HCl (Roxicodone -)  5 mg PO Q4H PRN


   PRN Reason: PAIN LEVEL 1-3


   Last Admin: 10/11/19 22:13 Dose:  5 mg











- Objective


Vital Signs: 


 Vital Signs











Temperature  98.7 F   10/12/19 05:15


 


Pulse Rate  90   10/12/19 05:15


 


Respiratory Rate  20   10/12/19 05:15


 


Blood Pressure  140/78   10/12/19 05:15


 


O2 Sat by Pulse Oximetry (%)  98   10/11/19 09:00











Constitutional: Yes: No Distress


HENT: Yes: Atraumatic


Neck: Yes: Supple


Cardiovascular: Yes: Regular Rate and Rhythm


Respiratory: Yes: CTA Bilaterally


Extremities: Yes: Other (R foot in dressing)


Neurological: Yes: Alert, Oriented


Labs: 


 CBC, BMP





 10/12/19 11:30 











Problem List





- Problems


(1) Infection (chronic) of amputation stump


Assessment/Plan: 


s/p aspiration of abcess


on iv bax


prn pain meds


Code(s): T87.40 - INFECTION OF AMPUTATION STUMP, UNSPECIFIED EXTREMITY   





(2) Nasal congestion


Code(s): R09.81 - NASAL CONGESTION

## 2019-10-12 NOTE — PN
Progress Note, Physician


Chief Complaint: 





30 y/o post op right foot I and D. Feeling much better. Up in bed. Complains of 

lower back pain. Denies any f/c/n/v/sob. 





- Current Medication List


Current Medications: 


Active Medications





Acetaminophen (Tylenol -)  650 mg PO Q6H PRN


   PRN Reason: FEVER


Fentanyl (Sublimaze Injection -)  50 mcg IVPUSH [id number] PRN


   PRN Reason: PAIN-PACU ORDER X 4 DOSES ONLY


Heparin Sodium (Porcine) (Heparin -)  5,000 unit SQ BID [facility]


   Last Admin: 10/12/19 10:11 Dose:  5,000 unit


Hydromorphone HCl (Dilaudid Vial -)  2 mg IVPB Q4H PRN


   PRN Reason: PAIN 4-8


   Last Admin: 10/12/19 12:33 Dose:  2 mg


Cefazolin Sodium (Ancef 1 Gm Premixed Ivpb -)  1 gm in 50 mls @ 100 mls/hr IVPB 

Q8H-IV [facility]


   Last Admin: 10/12/19 10:09 Dose:  100 mls/hr


Ondansetron HCl (Zofran Injection)  4 mg IVPUSH Q6H PRN


   PRN Reason: NAUSEA AND/OR VOMITING


Oxycodone HCl (Roxicodone -)  5 mg PO Q4H PRN


   PRN Reason: PAIN LEVEL 1-3


   Last Admin: 10/11/19 22:13 Dose:  5 mg











- Objective


Vital Signs: 


 Vital Signs











Temperature  97.8 F   10/12/19 10:00


 


Pulse Rate  88   10/12/19 10:00


 


Respiratory Rate  18   10/12/19 10:00


 


Blood Pressure  136/78   10/12/19 10:00


 


O2 Sat by Pulse Oximetry (%)  98   10/11/19 09:00











Constitutional: Yes: Well Nourished


Extremities: Yes: Other (right incison site intact with sutures, some keratosis 

and hardening of the distal flap site noted, no further erythema and much 

decreased, no drainage, no pain on palpation, edema decreased, no signs of 

residual infection)


Labs: 


 CBC, BMP





 10/12/19 11:30 





 10/12/19 11:36 











Assessment/Plan





30 y/o s/p right foot I and D 








P:


Evaluated and reviewed


Discussed with Song; MRSA seen


Will need Iv abx per ID for length 


Incision site healing well but concern for long term survivability of the flap 

discussed with patient


upon d/c [place] [facility] [place] 

recc follow up with plastic surgeon in [facility] who did primary free flap. 


He understands


While admitted can change with wet to dry daily and then leave c/d/i upon d/c 

until follow up. 


Wound stable from podiatry standpoint.

## 2019-10-13 RX ADMIN — ACETAMINOPHEN PRN MG: 325 TABLET ORAL at 21:20

## 2019-10-13 RX ADMIN — VANCOMYCIN HYDROCHLORIDE SCH MLS/HR: 1 INJECTION, POWDER, LYOPHILIZED, FOR SOLUTION INTRAVENOUS at 09:28

## 2019-10-13 RX ADMIN — HEPARIN SODIUM SCH UNIT: 5000 INJECTION, SOLUTION INTRAVENOUS; SUBCUTANEOUS at 09:28

## 2019-10-13 RX ADMIN — HEPARIN SODIUM SCH UNIT: 5000 INJECTION, SOLUTION INTRAVENOUS; SUBCUTANEOUS at 21:12

## 2019-10-13 RX ADMIN — VANCOMYCIN HYDROCHLORIDE SCH MLS/HR: 1 INJECTION, POWDER, LYOPHILIZED, FOR SOLUTION INTRAVENOUS at 21:12

## 2019-10-13 NOTE — PN
Progress Note, Physician





- Current Medication List


Current Medications: 


Active Medications





Acetaminophen (Tylenol -)  650 mg PO Q6H PRN


   PRN Reason: FEVER


Azithromycin (Zithromax -)  250 mg PO [facility]ILY [facility]


   Stop: 10/17/19 10:01


Fentanyl (Sublimaze Injection -)  50 mcg IVPUSH [id number] PRN


   PRN Reason: PAIN-PACU ORDER X 4 DOSES ONLY


Heparin Sodium (Porcine) (Heparin -)  5,000 unit SQ BID [facility]


   Last Admin: 10/13/19 09:28 Dose:  5,000 unit


Hydromorphone HCl (Dilaudid Vial -)  2 mg IVPB Q4H PRN


   PRN Reason: PAIN 4-8


   Last Admin: 10/13/19 15:21 Dose:  2 mg


Vancomycin HCl (Vancomycin (Pre-Docked))  1,000 mg in 250 mls @ 166.667 mls/hr 

IVPB Q12H [facility]


   Last Admin: 10/13/19 09:28 Dose:  166.667 mls/hr


Ondansetron HCl (Zofran Injection)  4 mg IVPUSH Q6H PRN


   PRN Reason: NAUSEA AND/OR VOMITING


Oxycodone HCl (Roxicodone -)  5 mg PO Q4H PRN


   PRN Reason: PAIN LEVEL 1-3


   Last Admin: 10/13/19 14:15 Dose:  5 mg











- Objective


Vital Signs: 


 Vital Signs











Temperature  98.7 F   10/13/19 14:53


 


Pulse Rate  98 H  10/13/19 14:53


 


Respiratory Rate  20   10/13/19 05:03


 


Blood Pressure  107/70   10/13/19 14:53


 


O2 Sat by Pulse Oximetry (%)  98   10/11/19 09:00











Constitutional: Yes: No Distress


HENT: Yes: Atraumatic


Neck: Yes: Supple


Cardiovascular: Yes: Regular Rate and Rhythm


Respiratory: Yes: CTA Bilaterally


Gastrointestinal: Yes: Normal Bowel Sounds


Extremities: Yes: Other (R foot/stump in dressing)


Edema: No


Neurological: Yes: Alert, Oriented


Labs: 


 CBC, BMP





 10/12/19 11:30 





 10/12/19 11:36 











Problem List





- Problems


(1) Infection (chronic) of amputation stump


Assessment/Plan: 


s/p aspiration of abcess


on iv bax


prn pain meds


Code(s): T87.40 - INFECTION OF AMPUTATION STUMP, UNSPECIFIED EXTREMITY   





(2) Nasal congestion


Code(s): R09.81 - NASAL CONGESTION

## 2019-10-13 NOTE — PN
Progress Note, Physician


History of Present Illness: 





Pt is alert, without acute distress. Has been complaining of cough. Reports 

hemoptysis yesterday but none today. Felt better after breathing treatment. 

Remains afebrile. No other complaints. Pain in LE controlled.





- Current Medication List


Current Medications: 


Active Medications





Acetaminophen (Tylenol -)  650 mg PO Q6H PRN


   PRN Reason: FEVER


Fentanyl (Sublimaze Injection -)  50 mcg IVPUSH [id number] PRN


   PRN Reason: PAIN-PACU ORDER X 4 DOSES ONLY


Heparin Sodium (Porcine) (Heparin -)  5,000 unit SQ BID [facility]


   Last Admin: 10/13/19 09:28 Dose:  5,000 unit


Hydromorphone HCl (Dilaudid Vial -)  2 mg IVPB Q4H PRN


   PRN Reason: PAIN 4-8


   Last Admin: 10/13/19 11:24 Dose:  2 mg


Vancomycin HCl (Vancomycin (Pre-Docked))  1,000 mg in 250 mls @ 166.667 mls/hr 

IVPB Q12H [facility]; Protocol


   Last Admin: 10/13/19 09:28 Dose:  166.667 mls/hr


Ondansetron HCl (Zofran Injection)  4 mg IVPUSH Q6H PRN


   PRN Reason: NAUSEA AND/OR VOMITING


Oxycodone HCl (Roxicodone -)  5 mg PO Q4H PRN


   PRN Reason: PAIN LEVEL 1-3


   Last Admin: 10/13/19 14:15 Dose:  5 mg











- Objective


Vital Signs: 


 Vital Signs











Temperature  98.7 F   10/13/19 14:53


 


Pulse Rate  98 H  10/13/19 14:53


 


Respiratory Rate  20   10/13/19 05:03


 


Blood Pressure  107/70   10/13/19 14:53


 


O2 Sat by Pulse Oximetry (%)  98   10/11/19 09:00











Constitutional: Yes: No Distress, Calm


Eyes: Yes: Conjunctiva Clear


HENT: Yes: WNL


Cardiovascular: Yes: Regular Rate and Rhythm


Respiratory: Yes: CTA Bilaterally


Gastrointestinal: Yes: Normal Bowel Sounds, Soft


Genitourinary: Yes: WNL


Extremities: Yes: WNL


Wound/Incision: Yes: Dressing Dry and Intact


Labs: 


 CBC, BMP





 10/12/19 11:30 





 10/12/19 11:36 





 Microbiology





10/09/19 14:15   Blood - Peripheral Venous   Blood Culture - Preliminary


                            NO GROWTH OBTAINED AFTER 96 HOURS, INCUBATION TO 

CONTINUE


                            FOR 1 DAYS.


10/09/19 14:00   Blood - Peripheral Venous   Blood Culture - Preliminary


                            NO GROWTH OBTAINED AFTER 96 HOURS, INCUBATION TO 

CONTINUE


                            FOR 1 DAYS.


10/10/19 20:45   Foot - Right   Gram Stain - Final


10/10/19 20:45   Foot - Right   Wound Culture - Final


                            Mr S Aureus











- ....Imaging


MRI: Report Reviewed





Problem List





- Problems


(1) Infection (chronic) of amputation stump


Code(s): T87.40 - INFECTION OF AMPUTATION STUMP, UNSPECIFIED EXTREMITY   





Assessment/Plan





RLE / amp site abscess s/p I+D


Cough/ Possible Acute bronchitis





-- Pt is afebrile, without distress


-- reports hemoptysis yesterday but none today, has yellow/green sputum/

persistent cough 


-- Wound cultures noted +MRSA, Antibiotics were changed to Vancomycin IV 

yesterday


-- will add azithromycin po , monitor for further hemoptysis and possible need 

for repeat chest imaging


-- Vancomycin trough prior to 4th dose, monitor renal function


-- continue wound care


Pt currently stable

## 2019-10-14 RX ADMIN — HEPARIN SODIUM SCH UNIT: 5000 INJECTION, SOLUTION INTRAVENOUS; SUBCUTANEOUS at 09:38

## 2019-10-14 RX ADMIN — VANCOMYCIN HYDROCHLORIDE SCH MLS/HR: 1 INJECTION, POWDER, LYOPHILIZED, FOR SOLUTION INTRAVENOUS at 22:16

## 2019-10-14 RX ADMIN — AZITHROMYCIN SCH MG: 250 TABLET, FILM COATED ORAL at 09:38

## 2019-10-14 RX ADMIN — HEPARIN SODIUM SCH UNIT: 5000 INJECTION, SOLUTION INTRAVENOUS; SUBCUTANEOUS at 21:48

## 2019-10-14 RX ADMIN — VANCOMYCIN HYDROCHLORIDE SCH MLS/HR: 1 INJECTION, POWDER, LYOPHILIZED, FOR SOLUTION INTRAVENOUS at 09:45

## 2019-10-14 NOTE — PN
Progress Note, Physician


History of Present Illness: 





stable


no new





- Current Medication List


Current Medications: 


Active Medications





Acetaminophen (Tylenol -)  650 mg PO Q6H PRN


   PRN Reason: FEVER


   Last Admin: 10/13/19 21:20 Dose:  650 mg


Azithromycin (Zithromax -)  250 mg PO DAILY [facility]


   Stop: 10/17/19 10:01


   Last Admin: 10/14/19 09:38 Dose:  250 mg


Fentanyl (Sublimaze Injection -)  50 mcg IVPUSH [id number] PRN


   PRN Reason: PAIN-PACU ORDER X 4 DOSES ONLY


Heparin Sodium (Porcine) (Heparin -)  5,000 unit SQ BID [facility]


   Last Admin: 10/14/19 09:38 Dose:  5,000 unit


Vancomycin HCl (Vancomycin (Pre-Docked))  1,000 mg in 250 mls @ 166.667 mls/hr 

IVPB Q12H [facility]; Protocol


   Last Admin: 10/13/19 21:12 Dose:  166.667 mls/hr


Morphine Sulfate (Morphine Sulfate)  3 mg IVPUSH Q4H PRN


   PRN Reason: PAIN LEVEL 7-10


   Last Admin: 10/14/19 09:37 Dose:  3 mg


Ondansetron HCl (Zofran Injection)  4 mg IVPUSH Q6H PRN


   PRN Reason: NAUSEA AND/OR VOMITING


Oxycodone HCl (Roxicodone -)  10 mg PO Q6H PRN


   PRN Reason: PAIN LEVEL 4 - 6


   Last Admin: 10/13/19 21:20 Dose:  10 mg











- Objective


Vital Signs: 


 Vital Signs











Temperature  97.9 F   10/14/19 05:00


 


Pulse Rate  48 L  10/14/19 05:00


 


Respiratory Rate  20   10/13/19 05:03


 


Blood Pressure  101/51 L  10/14/19 05:00


 


O2 Sat by Pulse Oximetry (%)  98   10/11/19 09:00











Constitutional: Yes: No Distress, Calm


Cardiovascular: Yes: S1, S2


Respiratory: Yes: Regular, CTA Bilaterally


Gastrointestinal: Yes: Normal Bowel Sounds, Soft


Musculoskeletal: Yes: WNL


Extremities: Yes: WNL


Wound/Incision: Yes: Dressing Dry and Intact


Neurological: Yes: Alert, Oriented


Psychiatric: Yes: Alert, Oriented


Labs: 


 CBC, BMP





 10/12/19 11:30 





 10/12/19 11:36 











Assessment/Plan





Problem List





- Problems


(1) Infection (chronic) of amputation stump


Code(s): T87.40 - INFECTION OF AMPUTATION STUMP, UNSPECIFIED EXTREMITY   





Assessment/Plan





RLE / amp site abscess s/p I+D


Cough/ Possible Acute bronchitis





plan


continue current abx


monitor cough


rest as per the team

## 2019-10-14 NOTE — PN
Progress Note, Physician





- Current Medication List


Current Medications: 


Active Medications





Acetaminophen (Tylenol -)  650 mg PO Q6H PRN


   PRN Reason: FEVER


   Last Admin: 10/13/19 21:20 Dose:  650 mg


Azithromycin (Zithromax -)  250 mg PO DAILY [facility]


   Stop: 10/17/19 10:01


   Last Admin: 10/14/19 09:38 Dose:  250 mg


Fentanyl (Sublimaze Injection -)  50 mcg IVPUSH [id number] PRN


   PRN Reason: PAIN-PACU ORDER X 4 DOSES ONLY


Heparin Sodium (Porcine) (Heparin -)  5,000 unit SQ BID [facility]


   Last Admin: 10/14/19 09:38 Dose:  5,000 unit


Vancomycin HCl (Vancomycin (Pre-Docked))  1,000 mg in 250 mls @ 166.667 mls/hr 

IVPB Q12H [facility]; Protocol


   Last Admin: 10/14/19 09:45 Dose:  166.667 mls/hr


Morphine Sulfate (Morphine Sulfate)  3 mg IVPUSH Q4H PRN


   PRN Reason: PAIN LEVEL 7-10


   Last Admin: 10/14/19 09:37 Dose:  3 mg


Ondansetron HCl (Zofran Injection)  4 mg IVPUSH Q6H PRN


   PRN Reason: NAUSEA AND/OR VOMITING


Oxycodone HCl (Roxicodone -)  10 mg PO Q6H PRN


   PRN Reason: PAIN LEVEL 4 - 6


   Last Admin: 10/14/19 11:47 Dose:  10 mg











- Objective


Vital Signs: 


 Vital Signs











Temperature  99 F   10/14/19 18:00


 


Pulse Rate  68   10/14/19 18:00


 


Respiratory Rate  18   10/14/19 18:00


 


Blood Pressure  116/76   10/14/19 18:00


 


O2 Sat by Pulse Oximetry (%)  95   10/14/19 09:00











Constitutional: Yes: No Distress


HENT: Yes: Atraumatic


Neck: Yes: Supple


Cardiovascular: Yes: Regular Rate and Rhythm


Respiratory: Yes: CTA Bilaterally


Gastrointestinal: Yes: Normal Bowel Sounds


Extremities: Yes: Other (R foot/stump in dressing)


Neurological: Yes: Alert, Oriented


Labs: 


 CBC, BMP





 10/12/19 11:30 





 10/12/19 11:36 











Problem List





- Problems


(1) Infection (chronic) of amputation stump


Assessment/Plan: 


s/p aspiration of abcess


on iv bax


prn pain meds


Code(s): T87.40 - INFECTION OF AMPUTATION STUMP, UNSPECIFIED EXTREMITY   





(2) Nasal congestion


Code(s): R09.81 - NASAL CONGESTION

## 2019-10-14 NOTE — PN
Progress Note (short form)





- Note


Progress Note: 





Podiatry F/U:





Seen/evaluated at bedside NAD.  Pain controlled, denies F/V/N/C/SOB/CP.  

Afebrile.  S/p right foot incision and drainage of abscess.





JESUS:





R foot: pedal pulses palpable, TG wnl.  Sutures well coapted along plantar 

incision, no dehiscence noted, no purulent drainage, no fluctuance, no soft 

tissue crepitus no streaking cellulitis, no signs of acute infection.  Moderate 

tenderness on palpation.





OR Cx: MRSA





Imp: 29 year old male s/p right foot incision and drainage of abscess


1. Abx per infectious disease


2. DSD R foot


3. Nonweightbearing right foot


4. Podiatry stable for discharge.  Can f/u in office with Dr. Arnett or with 

myself in wound healing center in 1 week.





JOSEPH Malcolm DPM

## 2019-10-15 VITALS — SYSTOLIC BLOOD PRESSURE: 135 MMHG | TEMPERATURE: 98.6 F | HEART RATE: 73 BPM | DIASTOLIC BLOOD PRESSURE: 73 MMHG

## 2019-10-15 RX ADMIN — VANCOMYCIN HYDROCHLORIDE SCH MLS/HR: 1 INJECTION, POWDER, LYOPHILIZED, FOR SOLUTION INTRAVENOUS at 09:04

## 2019-10-15 RX ADMIN — AZITHROMYCIN SCH MG: 250 TABLET, FILM COATED ORAL at 10:45

## 2019-10-15 RX ADMIN — ACETAMINOPHEN PRN MG: 325 TABLET ORAL at 14:09

## 2019-10-15 RX ADMIN — HEPARIN SODIUM SCH UNIT: 5000 INJECTION, SOLUTION INTRAVENOUS; SUBCUTANEOUS at 10:45

## 2019-10-15 NOTE — PN
Progress Note, Physician


History of Present Illness: 





patient stable


no new issues





- Current Medication List


Current Medications: 


Active Medications





Acetaminophen (Tylenol -)  650 mg PO Q6H PRN


   PRN Reason: FEVER


   Last Admin: 10/13/19 21:20 Dose:  650 mg


Azithromycin (Zithromax -)  250 mg PO DAILY [facility]


   Stop: 10/17/19 10:01


   Last Admin: 10/15/19 10:45 Dose:  250 mg


Fentanyl (Sublimaze Injection -)  50 mcg IVPUSH [id number] PRN


   PRN Reason: PAIN-PACU ORDER X 4 DOSES ONLY


Heparin Sodium (Porcine) (Heparin -)  5,000 unit SQ BID [facility]


   Last Admin: 10/15/19 10:45 Dose:  5,000 unit


Vancomycin HCl (Vancomycin (Pre-Docked))  1,000 mg in 250 mls @ 166.667 mls/hr 

IVPB Q12H [facility]; Protocol


   Last Admin: 10/15/19 09:04 Dose:  166.667 mls/hr


Morphine Sulfate (Morphine Sulfate)  3 mg IVPUSH Q4H PRN


   PRN Reason: PAIN LEVEL 7-10


   Last Admin: 10/14/19 21:47 Dose:  3 mg


Ondansetron HCl (Zofran Injection)  4 mg IVPUSH Q6H PRN


   PRN Reason: NAUSEA AND/OR VOMITING


Oxycodone HCl (Roxicodone -)  10 mg PO Q6H PRN


   PRN Reason: PAIN LEVEL 4 - 6


   Last Admin: 10/15/19 09:03 Dose:  10 mg











- Objective


Vital Signs: 


 Vital Signs











Temperature  98 F   10/15/19 06:00


 


Pulse Rate  56 L  10/15/19 06:00


 


Respiratory Rate  18   10/15/19 06:00


 


Blood Pressure  105/54 L  10/15/19 06:00


 


O2 Sat by Pulse Oximetry (%)  95   10/14/19 21:00











Constitutional: Yes: No Distress, Calm


Cardiovascular: Yes: Regular Rate and Rhythm


Respiratory: Yes: Regular, CTA Bilaterally


Gastrointestinal: Yes: Normal Bowel Sounds, Soft


Musculoskeletal: Yes: WNL


Extremities: Yes: WNL


Neurological: Yes: Alert, Oriented


Psychiatric: Yes: Alert, Oriented


Labs: 


 CBC, BMP





 10/12/19 11:30 





 10/12/19 11:36 











Assessment/Plan





Problem List





- Problems


(1) Infection (chronic) of amputation stump


Code(s): T87.40 - INFECTION OF AMPUTATION STUMP, UNSPECIFIED EXTREMITY   





Assessment/Plan





RLE / amp site abscess s/p I+D


Cough/ Possible Acute bronchitis





plan


continue current abx


monitor cough


rest as per the team


await for podiatry to see the patient and give final recommendations

## 2019-10-15 NOTE — DS
Physical Examination


Vital Signs: 


 Vital Signs











Temperature  98.6 F   10/15/19 18:00


 


Pulse Rate  73   10/15/19 18:00


 


Respiratory Rate  20   10/15/19 18:00


 


Blood Pressure  135/73   10/15/19 18:00


 


O2 Sat by Pulse Oximetry (%)  96   10/15/19 09:00











Constitutional: Yes: No Distress


HENT: Yes: Atraumatic


Neck: Yes: Supple


Cardiovascular: Yes: Regular Rate and Rhythm


Respiratory: Yes: CTA Bilaterally


Gastrointestinal: Yes: Normal Bowel Sounds


Extremities: Yes: Other (R foot/stump in dressing)


Labs: 


 CBC, BMP





 10/12/19 11:30 





 10/12/19 11:36 











Discharge Summary


Problems reviewed: Yes


Reason For Visit: CHRONIC INFECTION OF AMPUTATION STUMP


Current Active Problems





Infection (chronic) of amputation stump (Acute)








Condition: Stable





- Instructions


Diet, Activity, Other Instructions: 


wound care fu


dressing change


Referrals: 


Isabel Zuleta MD [Staff Physician] - 


Nakul Malcolm MD [Staff Physician] - 





- Home Medications


Comprehensive Discharge Medication List: 


Ambulatory Orders





Doxycycline Hyclate 100 mg PO BID #28 tablet 10/15/19 








Prescription Drug Monitoring Program (I-STOP) results: I-STOP reviewed and no 

issues identified

## 2023-02-26 ENCOUNTER — HOSPITAL ENCOUNTER (EMERGENCY)
Dept: HOSPITAL 74 - JERFT | Age: 33
Discharge: HOME | End: 2023-02-26
Payer: COMMERCIAL

## 2023-02-26 VITALS
HEART RATE: 91 BPM | SYSTOLIC BLOOD PRESSURE: 114 MMHG | TEMPERATURE: 98.1 F | DIASTOLIC BLOOD PRESSURE: 77 MMHG | RESPIRATION RATE: 18 BRPM

## 2023-02-26 VITALS — BODY MASS INDEX: 26.3 KG/M2

## 2023-02-26 DIAGNOSIS — J36: Primary | ICD-10-CM

## 2023-02-26 LAB
ALBUMIN SERPL-MCNC: 3.5 G/DL (ref 3.4–5)
ALP SERPL-CCNC: 146 U/L (ref 45–117)
ALT SERPL-CCNC: 37 U/L (ref 13–61)
ANION GAP SERPL CALC-SCNC: 3 MMOL/L (ref 8–16)
AST SERPL-CCNC: 16 U/L (ref 15–37)
BASOPHILS # BLD: 3 % (ref 0–2)
BILIRUB SERPL-MCNC: 0.4 MG/DL (ref 0.2–1)
BUN SERPL-MCNC: 14.2 MG/DL (ref 7–18)
CALCIUM SERPL-MCNC: 8.9 MG/DL (ref 8.5–10.1)
CHLORIDE SERPL-SCNC: 104 MMOL/L (ref 98–107)
CO2 SERPL-SCNC: 31 MMOL/L (ref 21–32)
CREAT SERPL-MCNC: 1 MG/DL (ref 0.55–1.3)
DEPRECATED RDW RBC AUTO: 13.6 % (ref 11.9–15.9)
EOSINOPHIL # BLD: 1.7 % (ref 0–4.5)
GLUCOSE SERPL-MCNC: 80 MG/DL (ref 74–106)
HCT VFR BLD CALC: 44.6 % (ref 35.4–49)
HGB BLD-MCNC: 15.5 GM/DL (ref 11.7–16.9)
LYMPHOCYTES # BLD: 17.5 % (ref 8–40)
MCH RBC QN AUTO: 29.9 PG (ref 25.7–33.7)
MCHC RBC AUTO-ENTMCNC: 34.7 G/DL (ref 32–35.9)
MCV RBC: 86.2 FL (ref 80–96)
MONOCYTES # BLD AUTO: 7.9 % (ref 3.8–10.2)
NEUTROPHILS # BLD: 69.9 % (ref 42.8–82.8)
PLATELET # BLD AUTO: 240 10^3/UL (ref 134–434)
PMV BLD: 9.1 FL (ref 7.5–11.1)
PROT SERPL-MCNC: 8 G/DL (ref 6.4–8.2)
RBC # BLD AUTO: 5.17 M/MM3 (ref 4–5.6)
SODIUM SERPL-SCNC: 138 MMOL/L (ref 136–145)
WBC # BLD AUTO: 7.6 K/MM3 (ref 4–10)

## 2023-02-26 PROCEDURE — 3E033GC INTRODUCTION OF OTHER THERAPEUTIC SUBSTANCE INTO PERIPHERAL VEIN, PERCUTANEOUS APPROACH: ICD-10-PCS
